# Patient Record
Sex: MALE | Race: OTHER | URBAN - METROPOLITAN AREA
[De-identification: names, ages, dates, MRNs, and addresses within clinical notes are randomized per-mention and may not be internally consistent; named-entity substitution may affect disease eponyms.]

---

## 2023-04-20 ENCOUNTER — EMERGENCY (EMERGENCY)
Facility: HOSPITAL | Age: 38
LOS: 1 days | Discharge: HOME | End: 2023-04-20
Admitting: EMERGENCY MEDICINE
Payer: COMMERCIAL

## 2023-04-20 VITALS
WEIGHT: 184.97 LBS | DIASTOLIC BLOOD PRESSURE: 94 MMHG | RESPIRATION RATE: 18 BRPM | HEART RATE: 83 BPM | TEMPERATURE: 99 F | HEIGHT: 72 IN | OXYGEN SATURATION: 99 % | SYSTOLIC BLOOD PRESSURE: 155 MMHG

## 2023-04-20 PROCEDURE — 99284 EMERGENCY DEPT VISIT MOD MDM: CPT

## 2023-04-20 NOTE — ED ADULT NURSE NOTE - OBJECTIVE STATEMENT
Patient presents with complaints of fatigue and feeling tired. Just arrived from [place]. Patient is visiting from [facility]. C/o headache and feeling anxious. Denies fever, chills, nausea, vomiting, coughing, sore throat. Negative for COVID.

## 2023-04-20 NOTE — ED ADULT NURSE NOTE - CHIEF COMPLAINT QUOTE
pt biba from [facility] c/o fatigue. [place] [facility], spent last few days in [place], [place]. Afebrile, took paracetamol 40 min pta ([id number])

## 2023-04-20 NOTE — ED PROVIDER NOTE - OBJECTIVE STATEMENT
38 y/o M with no PMH presents to the ED for evaluation. Pt reports he was feeling fatigued after flight from [place]. Pt noted having HA and anxiousness during taxi ride from airport. He denies fevers, chills, CP, or shortness of breath. Pt took a dose of Alprazolam prior to arrival.

## 2023-04-20 NOTE — ED PROVIDER NOTE - PATIENT PORTAL LINK FT
You can access the FollowMyHealth Patient Portal offered by [facility] by registering at the following website: [email or web address]. By joining [facility]’s FollowMyHealth portal, you will also be able to view your health information using other applications (apps) compatible with our system.

## 2023-04-20 NOTE — ED ADULT TRIAGE NOTE - CHIEF COMPLAINT QUOTE
pt biba from [facility] c/o fatigue. Vis[place] from [facility], spent last few days in [place], [place]. Afebrile, took paracetamol 40 min pta ([id number])

## 2023-04-22 DIAGNOSIS — R51.9 HEADACHE, UNSPECIFIED: ICD-10-CM

## 2023-04-22 DIAGNOSIS — R53.83 OTHER FATIGUE: ICD-10-CM

## 2023-04-22 DIAGNOSIS — F41.9 ANXIETY DISORDER, UNSPECIFIED: ICD-10-CM
